# Patient Record
Sex: FEMALE | Race: WHITE
[De-identification: names, ages, dates, MRNs, and addresses within clinical notes are randomized per-mention and may not be internally consistent; named-entity substitution may affect disease eponyms.]

---

## 2018-04-13 ENCOUNTER — HOSPITAL ENCOUNTER (OUTPATIENT)
Dept: HOSPITAL 92 - BICMAMMO | Age: 43
Discharge: HOME | End: 2018-04-13
Attending: OBSTETRICS & GYNECOLOGY
Payer: COMMERCIAL

## 2018-04-13 DIAGNOSIS — Z12.31: Primary | ICD-10-CM

## 2018-04-13 PROCEDURE — 77067 SCR MAMMO BI INCL CAD: CPT

## 2018-04-13 PROCEDURE — 77063 BREAST TOMOSYNTHESIS BI: CPT

## 2018-04-17 ENCOUNTER — HOSPITAL ENCOUNTER (OUTPATIENT)
Dept: HOSPITAL 92 - BICMAMMO | Age: 43
Discharge: HOME | End: 2018-04-17
Attending: OBSTETRICS & GYNECOLOGY
Payer: COMMERCIAL

## 2018-04-17 DIAGNOSIS — N64.89: ICD-10-CM

## 2018-04-17 DIAGNOSIS — R92.2: Primary | ICD-10-CM

## 2018-04-17 PROCEDURE — G0279 TOMOSYNTHESIS, MAMMO: HCPCS

## 2018-10-26 ENCOUNTER — HOSPITAL ENCOUNTER (OUTPATIENT)
Dept: HOSPITAL 92 - BICMAMMO | Age: 43
Discharge: HOME | End: 2018-10-26
Attending: OBSTETRICS & GYNECOLOGY
Payer: COMMERCIAL

## 2018-10-26 DIAGNOSIS — R92.8: Primary | ICD-10-CM

## 2018-10-26 PROCEDURE — G0279 TOMOSYNTHESIS, MAMMO: HCPCS

## 2018-10-26 NOTE — ULT
ULTRASOUND LEFT BREAST:

 

History: Abnormal mammogram

 

Comparison: Mammogram same day, multiple prior mammograms. 

 

FINDINGS: 

Corresponding to the mammographic abnormality in the right breast 3 o'clock is a benign appearing lym
ph node, the imaging appearance of the mammographic abnormality. 

 

IMPRESSION: 

BIRADS 2 - benign findings. Continued screening is recommended. 

 

POS: OFF

## 2023-04-14 LAB
HGB BLD-MCNC: 9.4 G/DL (ref 12–15.5)
MCH RBC QN AUTO: 21.1 PG (ref 27–33)
MCV RBC AUTO: 72.2 FL (ref 81.6–98.3)
PLATELET # BLD AUTO: 432 10X3/UL (ref 150–450)
PREGS CONTROL BACKGROUND?: (no result)
PREGS CONTROL BAR APPEAR?: YES
RBC # BLD AUTO: 4.46 10X6/UL (ref 3.9–5.03)
WBC # BLD AUTO: 6.6 10X3/UL (ref 3.5–10.5)

## 2023-04-18 ENCOUNTER — HOSPITAL ENCOUNTER (OUTPATIENT)
Dept: HOSPITAL 92 - CSHSDC | Age: 48
Discharge: HOME | End: 2023-04-18
Attending: OBSTETRICS & GYNECOLOGY
Payer: COMMERCIAL

## 2023-04-18 VITALS — BODY MASS INDEX: 41.1 KG/M2

## 2023-04-18 DIAGNOSIS — Z87.59: ICD-10-CM

## 2023-04-18 DIAGNOSIS — N88.8: ICD-10-CM

## 2023-04-18 DIAGNOSIS — Z79.899: ICD-10-CM

## 2023-04-18 DIAGNOSIS — Z98.891: ICD-10-CM

## 2023-04-18 DIAGNOSIS — N87.9: ICD-10-CM

## 2023-04-18 DIAGNOSIS — N83.02: ICD-10-CM

## 2023-04-18 DIAGNOSIS — E66.9: ICD-10-CM

## 2023-04-18 DIAGNOSIS — Z90.49: ICD-10-CM

## 2023-04-18 DIAGNOSIS — N73.6: ICD-10-CM

## 2023-04-18 DIAGNOSIS — N80.03: Primary | ICD-10-CM

## 2023-04-18 PROCEDURE — 86900 BLOOD TYPING SEROLOGIC ABO: CPT

## 2023-04-18 PROCEDURE — 0UT94ZZ RESECTION OF UTERUS, PERCUTANEOUS ENDOSCOPIC APPROACH: ICD-10-PCS | Performed by: OBSTETRICS & GYNECOLOGY

## 2023-04-18 PROCEDURE — 86850 RBC ANTIBODY SCREEN: CPT

## 2023-04-18 PROCEDURE — S0028 INJECTION, FAMOTIDINE, 20 MG: HCPCS

## 2023-04-18 PROCEDURE — 36415 COLL VENOUS BLD VENIPUNCTURE: CPT

## 2023-04-18 PROCEDURE — 84703 CHORIONIC GONADOTROPIN ASSAY: CPT

## 2023-04-18 PROCEDURE — 85027 COMPLETE CBC AUTOMATED: CPT

## 2023-04-18 PROCEDURE — 0UT74ZZ RESECTION OF BILATERAL FALLOPIAN TUBES, PERCUTANEOUS ENDOSCOPIC APPROACH: ICD-10-PCS | Performed by: OBSTETRICS & GYNECOLOGY

## 2023-04-18 PROCEDURE — 86901 BLOOD TYPING SEROLOGIC RH(D): CPT

## 2023-04-18 PROCEDURE — 0UT24ZZ RESECTION OF BILATERAL OVARIES, PERCUTANEOUS ENDOSCOPIC APPROACH: ICD-10-PCS | Performed by: OBSTETRICS & GYNECOLOGY

## 2023-04-18 PROCEDURE — 88307 TISSUE EXAM BY PATHOLOGIST: CPT
